# Patient Record
Sex: MALE | Race: WHITE | NOT HISPANIC OR LATINO | URBAN - METROPOLITAN AREA
[De-identification: names, ages, dates, MRNs, and addresses within clinical notes are randomized per-mention and may not be internally consistent; named-entity substitution may affect disease eponyms.]

---

## 2018-03-14 ENCOUNTER — OFFICE (OUTPATIENT)
Dept: URBAN - METROPOLITAN AREA CLINIC 78 | Facility: CLINIC | Age: 48
End: 2018-03-14

## 2018-03-14 VITALS
DIASTOLIC BLOOD PRESSURE: 90 MMHG | TEMPERATURE: 97.7 F | HEIGHT: 65 IN | SYSTOLIC BLOOD PRESSURE: 130 MMHG | HEART RATE: 92 BPM | WEIGHT: 194 LBS

## 2018-03-14 DIAGNOSIS — R93.3 ABNORMAL FINDINGS ON DIAGNOSTIC IMAGING OF OTHER PARTS OF DI: ICD-10-CM

## 2018-03-14 DIAGNOSIS — R19.7 DIARRHEA, UNSPECIFIED: ICD-10-CM

## 2018-03-14 DIAGNOSIS — R10.13 EPIGASTRIC PAIN: ICD-10-CM

## 2018-03-14 DIAGNOSIS — K76.0 FATTY (CHANGE OF) LIVER, NOT ELSEWHERE CLASSIFIED: ICD-10-CM

## 2018-03-14 DIAGNOSIS — K59.09 OTHER CONSTIPATION: ICD-10-CM

## 2018-03-14 PROCEDURE — 99244 OFF/OP CNSLTJ NEW/EST MOD 40: CPT

## 2018-03-14 PROCEDURE — 00031: CPT

## 2018-03-14 NOTE — SERVICEHPINOTES
ALISON PERSAUD   is a   47   year old male who is being seen in consultation at the request of   BAO MARSH   for stomach pain. Started getting upper abdominal pains 2 weeks ago. Started as dull ache. Developed a headache, went to Urgent Care to R/O flu. Due to pain during abdominal exam, was sent to ER. CT suggested possible pancreatitis, though lipase level was normal. Also with fatty liver and fatty pancreas and 1 cm nodule vs lymph node by uncinate process. He had U/S of GB - neg for stones. He notes pain level of 3 of 10, constant without overt triggers or alleviating factors. Currently with constipation, though he notes long h/o constipation with hard BMs. However if eats [employer] food he has liquid stools. He denies blood in stools. He used to drink a lot of sodas but is cutting back. He uses Motrin a few times a month. Denies alcohol other than one glass of wine once a month. No excessive drinking since college days. He did drink a couple of beers prior to onset of his symptoms. No fevers or vomiting. Has mild occasional heartburn.

## 2018-03-15 ENCOUNTER — AMBULATORY SURGICAL CENTER (OUTPATIENT)
Dept: URBAN - METROPOLITAN AREA SURGERY 21 | Facility: SURGERY | Age: 48
End: 2018-03-15

## 2018-03-15 DIAGNOSIS — R10.13 EPIGASTRIC PAIN: ICD-10-CM

## 2018-03-15 DIAGNOSIS — K29.60 OTHER GASTRITIS WITHOUT BLEEDING: ICD-10-CM

## 2018-03-15 PROCEDURE — 43239 EGD BIOPSY SINGLE/MULTIPLE: CPT

## 2018-04-17 ENCOUNTER — AMBULATORY SURGICAL CENTER (OUTPATIENT)
Dept: URBAN - METROPOLITAN AREA SURGERY 21 | Facility: SURGERY | Age: 48
End: 2018-04-17

## 2018-04-17 DIAGNOSIS — R10.84 GENERALIZED ABDOMINAL PAIN: ICD-10-CM

## 2018-04-17 DIAGNOSIS — R19.4 CHANGE IN BOWEL HABIT: ICD-10-CM

## 2018-04-17 PROCEDURE — 45378 DIAGNOSTIC COLONOSCOPY: CPT

## 2018-04-25 ENCOUNTER — OFFICE (OUTPATIENT)
Dept: URBAN - METROPOLITAN AREA CLINIC 78 | Facility: CLINIC | Age: 48
End: 2018-04-25

## 2018-04-25 VITALS
WEIGHT: 192 LBS | DIASTOLIC BLOOD PRESSURE: 81 MMHG | SYSTOLIC BLOOD PRESSURE: 132 MMHG | HEIGHT: 65 IN | HEART RATE: 68 BPM | TEMPERATURE: 97.6 F

## 2018-04-25 DIAGNOSIS — R10.13 EPIGASTRIC PAIN: ICD-10-CM

## 2018-04-25 DIAGNOSIS — K59.09 OTHER CONSTIPATION: ICD-10-CM

## 2018-04-25 DIAGNOSIS — R93.3 ABNORMAL FINDINGS ON DIAGNOSTIC IMAGING OF OTHER PARTS OF DI: ICD-10-CM

## 2018-04-25 DIAGNOSIS — K76.0 FATTY (CHANGE OF) LIVER, NOT ELSEWHERE CLASSIFIED: ICD-10-CM

## 2018-04-25 PROCEDURE — 99213 OFFICE O/P EST LOW 20 MIN: CPT

## 2018-04-25 NOTE — SERVICEHPINOTES
46 yo white male presents for f/u abdominal pain. His EGD and colonoscopy were normal. He saw Dr. See and has EUS scheduled for May for f/u of abnormal CT finding near pancreas. He is feeling pretty well overall. Has started to change his diet and has been able to lose a little weight. Wants to lose about 30 pounds eventually. Still struggles with constipation. Hasn't tried anything yet. No other concerns today.  Prior hx: At ER patient's CT suggested possible pancreatitis, though lipase level was normal. Also with fatty liver and fatty pancreas and 1 cm nodule vs lymph node by uncinate process. He had U/S of GB - neg for stones. He notes pain level of 3 of 10, constant without overt triggers or alleviating factors. Currently with constipation, though he notes long h/o constipation with hard BMs. However if eats [employer] food he has liquid stools. He denies blood in stools. He used to drink a lot of sodas but is cutting back. He uses Motrin a few times a month. Denies alcohol other than one glass of wine once a month. No excessive drinking since college days. He did drink a couple of beers prior to onset of his symptoms. No fevers or vomiting. Has mild occasional heartburn.